# Patient Record
Sex: FEMALE | Race: WHITE | Employment: FULL TIME | URBAN - METROPOLITAN AREA
[De-identification: names, ages, dates, MRNs, and addresses within clinical notes are randomized per-mention and may not be internally consistent; named-entity substitution may affect disease eponyms.]

---

## 2017-09-25 ENCOUNTER — TELEPHONE (OUTPATIENT)
Dept: OBGYN CLINIC | Facility: CLINIC | Age: 52
End: 2017-09-25

## 2017-09-25 NOTE — TELEPHONE ENCOUNTER
Refill request for Estrogen. Pt last seen for annual 7/20/15 with a f/u on 3/11/16. LM with pts  to have pt contact office to schedule an annual exam in order to receive further refills.

## 2017-09-25 NOTE — TELEPHONE ENCOUNTER
Scheduled pt for a f/u appt. Pt needs refill on her Estrogen. It is a controlled substance so informed pt that Dr. Beau Cochran needs to sign script tomorrow and will be sent to pharmacy.

## 2017-09-26 ENCOUNTER — TELEPHONE (OUTPATIENT)
Dept: OBGYN CLINIC | Facility: CLINIC | Age: 52
End: 2017-09-26

## 2017-10-04 ENCOUNTER — OFFICE VISIT (OUTPATIENT)
Dept: OBGYN CLINIC | Facility: CLINIC | Age: 52
End: 2017-10-04

## 2017-10-04 VITALS — WEIGHT: 157 LBS | BODY MASS INDEX: 27.82 KG/M2 | HEIGHT: 63 IN

## 2017-10-04 DIAGNOSIS — Z01.419 WOMEN'S ANNUAL ROUTINE GYNECOLOGICAL EXAMINATION: Primary | ICD-10-CM

## 2017-10-04 PROBLEM — Z90.79 H/O TOTAL HYSTERECTOMY WITH REMOVAL OF BOTH TUBES AND OVARIES: Status: ACTIVE | Noted: 2017-10-04

## 2017-10-04 PROBLEM — M06.9 RHEUMATOID ARTHRITIS (HCC): Status: ACTIVE | Noted: 2017-10-04

## 2017-10-04 PROBLEM — Z90.710 H/O TOTAL HYSTERECTOMY WITH REMOVAL OF BOTH TUBES AND OVARIES: Status: ACTIVE | Noted: 2017-10-04

## 2017-10-04 PROBLEM — Z79.890 HORMONE REPLACEMENT THERAPY: Status: ACTIVE | Noted: 2017-10-04

## 2017-10-04 PROBLEM — N80.9 ENDOMETRIOSIS: Status: ACTIVE | Noted: 2017-10-04

## 2017-10-04 PROBLEM — Z90.722 H/O TOTAL HYSTERECTOMY WITH REMOVAL OF BOTH TUBES AND OVARIES: Status: ACTIVE | Noted: 2017-10-04

## 2017-10-04 PROBLEM — K90.0 CELIAC DISEASE: Status: ACTIVE | Noted: 2017-10-04

## 2017-10-04 PROCEDURE — 99396 PREV VISIT EST AGE 40-64: CPT | Performed by: OBSTETRICS & GYNECOLOGY

## 2017-10-04 RX ORDER — ESTERIFIED ESTROGEN AND METHYLTESTOSTERONE .625; 1.25 MG/1; MG/1
1 TABLET ORAL DAILY
Qty: 90 TABLET | Refills: 3 | Status: SHIPPED | OUTPATIENT
Start: 2017-10-04 | End: 2017-11-06 | Stop reason: CLARIF

## 2017-10-04 RX ORDER — ASPIRIN 81 MG/1
TABLET, CHEWABLE ORAL
COMMUNITY

## 2017-10-04 RX ORDER — LEVOCETIRIZINE DIHYDROCHLORIDE 5 MG/1
TABLET, FILM COATED ORAL
COMMUNITY

## 2017-10-04 RX ORDER — MELATONIN: COMMUNITY

## 2017-10-04 RX ORDER — FOLIC ACID 1 MG/1
TABLET ORAL
COMMUNITY
Start: 2017-07-11

## 2017-10-04 RX ORDER — FOLIC ACID 100 %
POWDER (GRAM) MISCELLANEOUS
COMMUNITY
End: 2017-10-04

## 2017-10-04 NOTE — PROGRESS NOTES
Melecio Matute is here for a checkup. Patient is on hormone replacement therapy she takes Estratest and progesterone. She wants to continue with same.   Patient says that last year she is took it every other day and did not like the symptoms she was having and reaction(s): Other  Shellfish-Derived P*        Comment:Other reaction(s): Other    Medications       Current Outpatient Prescriptions:  Est Estrogens-Methyltest 0.625-1.25 MG Oral Tab Take 1 tablet by mouth daily.  Disp: 90 tablet Rfl: 3   Progesterone Don[place] Normal appearing, no lesions. Urethral meatus appear wnl, no abnormal discharge or lesions noted. Bladder: well supported, urethra wnl, no lesions or fissures                     Vagina: normal pink mucosa, no lesions, normal clear discharge.

## 2017-11-06 ENCOUNTER — TELEPHONE (OUTPATIENT)
Dept: OBGYN CLINIC | Facility: CLINIC | Age: 52
End: 2017-11-06

## 2017-11-06 NOTE — TELEPHONE ENCOUNTER
Per pharmacist, medication can only be filled for 6 months at a time as it is a controlled substance. Attempted to send again via e-script and it would not send. Called into pharm for #30 with 5 refills.

## 2017-11-06 NOTE — TELEPHONE ENCOUNTER
Refill request rec'd from pharmacy for Est Estrogens Methyltest 0.625mg. Dr. Beau Cochran sent script on 10/4/17. Pharmacy did not receive it. Resent it today.

## 2018-05-22 ENCOUNTER — TELEPHONE (OUTPATIENT)
Dept: OBGYN CLINIC | Facility: CLINIC | Age: 53
End: 2018-05-22

## 2018-05-22 NOTE — TELEPHONE ENCOUNTER
Fax rec'd from pharmacy for refill on her Estrogen Methyltest. Last fill was 11/6/17 and since med is a controlled substance according to pharmacist, only a 6 month refill at a time can be sent in.  Gave verbal refill + 4 to [facility], pharmacist. WALLACE for pt as w

## 2018-06-01 ENCOUNTER — TELEPHONE (OUTPATIENT)
Dept: OBGYN CLINIC | Facility: CLINIC | Age: 53
End: 2018-06-01

## 2018-11-06 ENCOUNTER — TELEPHONE (OUTPATIENT)
Dept: FAMILY MEDICINE CLINIC | Facility: CLINIC | Age: 53
End: 2018-11-06

## 2018-11-06 DIAGNOSIS — Z12.39 SCREENING BREAST EXAMINATION: Primary | ICD-10-CM

## 2018-11-07 NOTE — TELEPHONE ENCOUNTER
Pt due for annual (10/2018) (pt didn't have mammo in 2016 nor in 2017)    Pt scheduled for annual visit on 11/28/18. Needs one months supply for. Refilled for 1 months EST ESTROGENS-METHYLTEST HS 0.625-1.25 MG Oral    And annual mammo order sent to [facility].  [place]

## 2018-11-28 ENCOUNTER — OFFICE VISIT (OUTPATIENT)
Dept: OBGYN CLINIC | Facility: CLINIC | Age: 53
End: 2018-11-28
Payer: COMMERCIAL

## 2018-11-28 VITALS
HEIGHT: 63 IN | SYSTOLIC BLOOD PRESSURE: 122 MMHG | BODY MASS INDEX: 28.88 KG/M2 | DIASTOLIC BLOOD PRESSURE: 68 MMHG | WEIGHT: 163 LBS

## 2018-11-28 DIAGNOSIS — Z01.419 WOMEN'S ANNUAL ROUTINE GYNECOLOGICAL EXAMINATION: Primary | ICD-10-CM

## 2018-11-28 DIAGNOSIS — Z79.890 HORMONE REPLACEMENT THERAPY (HRT): ICD-10-CM

## 2018-11-28 PROCEDURE — 99396 PREV VISIT EST AGE 40-64: CPT | Performed by: OBSTETRICS & GYNECOLOGY

## 2018-11-28 RX ORDER — TETANUS TOXOID, REDUCED DIPHTHERIA TOXOID AND ACELLULAR PERTUSSIS VACCINE, ADSORBED 5; 2.5; 8; 8; 2.5 [IU]/.5ML; [IU]/.5ML; UG/.5ML; UG/.5ML; UG/.5ML
SUSPENSION INTRAMUSCULAR
Refills: 0 | COMMUNITY
Start: 2018-10-21 | End: 2019-11-20

## 2018-11-28 RX ORDER — IBUPROFEN 600 MG/1
600 TABLET ORAL
COMMUNITY
Start: 2011-12-08

## 2018-11-28 RX ORDER — ERGOCALCIFEROL 1.25 MG/1
CAPSULE ORAL
COMMUNITY
Start: 2018-11-23

## 2018-11-28 RX ORDER — ESTERIFIED ESTROGEN AND METHYLTESTOSTERONE .625; 1.25 MG/1; MG/1
1 TABLET ORAL DAILY
Qty: 90 TABLET | Refills: 3 | Status: SHIPPED | OUTPATIENT
Start: 2018-11-28 | End: 2018-12-18

## 2018-11-28 NOTE — PROGRESS NOTES
Keon Richards is here for a checkup. She has no gynecologic complaints. Patient is on estrogen replacement therapy and she wants to continue with the same therapy. She takes Estratest half tablet along with Prometrium daily.   Patient had severe case of endom Comment:Other reaction(s): Vomiting  Penicillins                 Comment:Other reaction(s): Hives/Urticaria  Propoxyphene N-Apap         Comment:Other reaction(s): Other  Quinolones                  Comment:Other reaction(s):  Other  Shellfish-Derived P* Sexual activity: Not on file    Other Topics      Concerns:        Not on file    Social History Narrative      Not on file      Exam     /68   Ht 63\"   Wt 163 lb   LMP  (Exact Date)   BMI 28.87 kg/m²     GENERAL: well developed, well nourished, in

## 2018-12-18 RX ORDER — ESTERIFIED ESTROGEN AND METHYLTESTOSTERONE .625; 1.25 MG/1; MG/1
1 TABLET ORAL DAILY
Qty: 90 TABLET | Refills: 3 | Status: SHIPPED | OUTPATIENT
Start: 2018-12-18 | End: 2020-01-13

## 2018-12-19 ENCOUNTER — TELEPHONE (OUTPATIENT)
Dept: OBGYN CLINIC | Facility: CLINIC | Age: 53
End: 2018-12-19

## 2018-12-19 NOTE — TELEPHONE ENCOUNTER
Patient left message on answering service asking for prescription for estrogen. Has been without this medication for 3 days.

## 2019-07-03 ENCOUNTER — TELEPHONE (OUTPATIENT)
Dept: OBGYN CLINIC | Facility: CLINIC | Age: 54
End: 2019-07-03

## 2019-07-03 NOTE — TELEPHONE ENCOUNTER
Per pt, pharmacy stated they could give pt only med for 6 months as it is a controlled substance and pt needs to request new Rx from [place]. Pt will  on Friday, placed in registration area for .  Pt verbalized understanding and agrees

## 2019-07-03 NOTE — TELEPHONE ENCOUNTER
Patient calling for Est Estrogens-Methyltest 0.625-1.25 MG Oral Tab refill. Patient will be out of her medication soon.

## 2019-07-03 NOTE — TELEPHONE ENCOUNTER
Advised pt to call pharmacy below as pt was given printed Rx with refills for 1 year supply. Pharmacy should have this in their system. Encouraged pt to call back with any problems/questions. Pt verbalized understanding and agrees.  Pt leaving for out of to

## 2019-11-20 ENCOUNTER — OFFICE VISIT (OUTPATIENT)
Dept: OBGYN CLINIC | Facility: CLINIC | Age: 54
End: 2019-11-20
Payer: COMMERCIAL

## 2019-11-20 VITALS
SYSTOLIC BLOOD PRESSURE: 124 MMHG | WEIGHT: 164 LBS | DIASTOLIC BLOOD PRESSURE: 84 MMHG | HEIGHT: 63 IN | BODY MASS INDEX: 29.06 KG/M2

## 2019-11-20 DIAGNOSIS — Z79.890 HORMONE REPLACEMENT THERAPY (HRT): ICD-10-CM

## 2019-11-20 DIAGNOSIS — Z01.419 WOMEN'S ANNUAL ROUTINE GYNECOLOGICAL EXAMINATION: Primary | ICD-10-CM

## 2019-11-20 PROBLEM — Z90.49 HISTORY OF COLON RESECTION: Status: ACTIVE | Noted: 2019-11-20

## 2019-11-20 PROCEDURE — 99396 PREV VISIT EST AGE 40-64: CPT | Performed by: OBSTETRICS & GYNECOLOGY

## 2019-11-20 RX ORDER — ESTERIFIED ESTROGEN AND METHYLTESTOSTERONE .625; 1.25 MG/1; MG/1
1 TABLET ORAL DAILY
Qty: 90 TABLET | Refills: 3 | OUTPATIENT
Start: 2019-11-20 | End: 2020-01-13

## 2019-11-21 NOTE — PROGRESS NOTES
Mohan Lennon is here for a checkup. Patient says that she is having occasional pulling sensation about 3 cm inferior and 3 cm lateral to umbilicus.   Patient has had colon resection as well as total abdominal hysterectomy and bilateral salpingo-oophorectomy fo HYSTERECTOMY         Allergies       Erythromycin            NAUSEA ONLY    Comment:Other reaction(s): Vomiting  Penicillins                 Comment:Other reaction(s): Hives/Urticaria  Propoxyphene N-Apap         Comment:Other reaction(s):  Other  Quinolone week: Not on file        Minutes per session: Not on file      Stress: Not on file    Relationships      Social connections:        Talks on phone: Not on file        Gets together: Not on file        Attends [employer] service: Not on file        Active me gynecological examination    2. Hormone replacement therapy (HRT)        Patient counseled on diet/exercise     Encounter Diagnosis and Orders   1.  Women's annual routine gynecological examination  Normal gynecologic exam following total abdominal hysterec

## 2020-01-13 NOTE — TELEPHONE ENCOUNTER
Pt had her well woman exam on 11/20/2019 and [facility] pt staying on [facility]. [facility]:    Est Estrogens-Methyltest 0.625-1.25 MG Oral Tab 90 tablet 3 11/20/2019 2/18/2020   Sig:   Take 1 tablet by mouth daily.      Route:   Oral       The end date

## 2022-10-13 ENCOUNTER — HOSPITAL ENCOUNTER (OUTPATIENT)
Age: 57
Discharge: HOME | End: 2022-10-13
Payer: COMMERCIAL

## 2022-10-13 VITALS
DIASTOLIC BLOOD PRESSURE: 85 MMHG | RESPIRATION RATE: 18 BRPM | HEART RATE: 83 BPM | SYSTOLIC BLOOD PRESSURE: 120 MMHG | TEMPERATURE: 98 F | OXYGEN SATURATION: 99 %

## 2022-10-13 DIAGNOSIS — R00.2 PALPITATIONS: Primary | ICD-10-CM

## 2022-10-13 DIAGNOSIS — U07.1 COVID-19 VIRUS INFECTION: ICD-10-CM

## 2022-10-13 LAB
#MXD IC: 0.6 X10ˆ3/UL (ref 0.1–1)
BUN BLD-MCNC: 15 MG/DL (ref 7–18)
CHLORIDE BLD-SCNC: 100 MMOL/L (ref 98–112)
CO2 BLD-SCNC: 26 MMOL/L (ref 21–32)
CREAT BLD-MCNC: 0.7 MG/DL
GFR SERPLBLD BASED ON 1.73 SQ M-ARVRAT: 100 ML/MIN/1.73M2 (ref 60–?)
GLUCOSE BLD-MCNC: 96 MG/DL (ref 70–99)
HCT VFR BLD AUTO: 43.5 %
HCT VFR BLD CALC: 45 %
HGB BLD-MCNC: 14.1 G/DL
ISTAT IONIZED CALCIUM FOR CHEM 8: 1.19 MMOL/L (ref 1.12–1.32)
LYMPHOCYTES # BLD AUTO: 1.6 X10ˆ3/UL (ref 1–4)
LYMPHOCYTES NFR BLD AUTO: 34.7 %
MCH RBC QN AUTO: 29.1 PG (ref 26–34)
MCHC RBC AUTO-ENTMCNC: 32.4 G/DL (ref 31–37)
MCV RBC AUTO: 89.7 FL (ref 80–100)
MIXED CELL %: 13.7 %
NEUTROPHILS # BLD AUTO: 2.3 X10ˆ3/UL (ref 1.5–7.7)
NEUTROPHILS NFR BLD AUTO: 51.6 %
PLATELET # BLD AUTO: 194 X10ˆ3/UL (ref 150–450)
POTASSIUM BLD-SCNC: 3.6 MMOL/L (ref 3.6–5.1)
RBC # BLD AUTO: 4.85 X10ˆ6/UL
SODIUM BLD-SCNC: 144 MMOL/L (ref 136–145)
TROPONIN I BLD-MCNC: <0.02 NG/ML
WBC # BLD AUTO: 4.5 X10ˆ3/UL (ref 4–11)

## 2022-10-13 PROCEDURE — 84484 ASSAY OF TROPONIN QUANT: CPT | Performed by: NURSE PRACTITIONER

## 2022-10-13 PROCEDURE — 99204 OFFICE O/P NEW MOD 45 MIN: CPT | Performed by: NURSE PRACTITIONER

## 2022-10-13 PROCEDURE — 93000 ELECTROCARDIOGRAM COMPLETE: CPT | Performed by: NURSE PRACTITIONER

## 2022-10-13 PROCEDURE — 85025 COMPLETE CBC W/AUTO DIFF WBC: CPT | Performed by: NURSE PRACTITIONER

## 2022-10-13 PROCEDURE — 80047 BASIC METABLC PNL IONIZED CA: CPT | Performed by: NURSE PRACTITIONER

## 2022-10-13 NOTE — ED INITIAL ASSESSMENT (HPI)
Pt here with feeling \"heart fluttering. PMD sent pt here for EKG. Pt tested positive for covid yesterday. Pt has had fever, sore throat and body aches since Tuesday night. Discomfort to chest and back when coughing.

## 2023-08-21 ENCOUNTER — TELEPHONE (OUTPATIENT)
Dept: OBGYN CLINIC | Facility: CLINIC | Age: 58
End: 2023-08-21